# Patient Record
Sex: MALE | Race: ASIAN | Employment: OTHER | URBAN - METROPOLITAN AREA
[De-identification: names, ages, dates, MRNs, and addresses within clinical notes are randomized per-mention and may not be internally consistent; named-entity substitution may affect disease eponyms.]

---

## 2024-04-26 ENCOUNTER — HOSPITAL ENCOUNTER (EMERGENCY)
Facility: HOSPITAL | Age: 74
Discharge: HOME | End: 2024-04-26
Attending: EMERGENCY MEDICINE
Payer: MEDICARE

## 2024-04-26 ENCOUNTER — APPOINTMENT (OUTPATIENT)
Dept: CT IMAGING | Facility: HOSPITAL | Age: 74
End: 2024-04-26
Attending: EMERGENCY MEDICINE
Payer: MEDICARE

## 2024-04-26 VITALS
RESPIRATION RATE: 18 BRPM | SYSTOLIC BLOOD PRESSURE: 168 MMHG | OXYGEN SATURATION: 99 % | HEART RATE: 88 BPM | DIASTOLIC BLOOD PRESSURE: 72 MMHG | TEMPERATURE: 98 F | WEIGHT: 135 LBS
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
## 2024-04-26 DIAGNOSIS — S01.81XA FACIAL LACERATION, INITIAL ENCOUNTER: ICD-10-CM

## 2024-04-26 PROCEDURE — 99283 EMERGENCY DEPT VISIT LOW MDM: CPT

## 2024-04-26 PROCEDURE — 12011 RPR F/E/E/N/L/M 2.5 CM/<: CPT

## 2024-04-26 PROCEDURE — 90471 IMMUNIZATION ADMIN: CPT

## 2024-04-26 PROCEDURE — 99284 EMERGENCY DEPT VISIT MOD MDM: CPT

## 2024-04-26 RX ORDER — AMLODIPINE BESYLATE 5 MG/1
5 TABLET ORAL DAILY
COMMUNITY

## 2024-04-26 RX ORDER — TRAMADOL HYDROCHLORIDE 50 MG/1
TABLET ORAL EVERY 6 HOURS PRN
Qty: 6 TABLET | Refills: 0 | Status: SHIPPED | OUTPATIENT
Start: 2024-04-26

## 2024-04-26 RX ORDER — SIMVASTATIN 10 MG
10 TABLET ORAL NIGHTLY
COMMUNITY

## 2024-04-26 RX ORDER — CEPHALEXIN 500 MG/1
500 CAPSULE ORAL 3 TIMES DAILY
Qty: 6 CAPSULE | Refills: 0 | Status: SHIPPED | OUTPATIENT
Start: 2024-04-26 | End: 2024-04-29

## 2024-04-26 NOTE — ED INITIAL ASSESSMENT (HPI)
Pt states he had tripped falling forward. Lac to upper lip and interior portior of upper gums   Denies any dizziness or syncope or LOC

## 2024-04-26 NOTE — DISCHARGE INSTRUCTIONS
For the laceration, would keep dry for 24 hours and then can wash gently with soap and water.  Sutures out in 4 to 5 days.  Will prophylax with antibiotics Keflex 1 tablet 3 times a day for 3 days.  Can take 1-2 extreme Tylenol as needed.  Recheck blood pressure with primary care.

## 2024-04-27 NOTE — ED PROVIDER NOTES
Patient Seen in: [facility] Emergency Department      History     Chief Complaint   Patient presents with    Laceration/Abrasion     Stated Complaint: fall, hit head, no blood thinners    Subjective:   HPI    Patient is a 73-year-old gentleman presents with a laceration to his upper lip.  Patient had dentures in.  Says he fell and hit his upper lip.  He has a laceration at the vermilion border of the upper lip.  He has abrasions of the gum and the inner aspect.  Does not appear to go all the way through the lip.  No other head injury.  No headache.  No neck pain.  Both daughters are at bedside.  Does not take blood thinners.  No other specific complaints.  No other injuries or complaints.    Objective:   Past Medical History:    Essential hypertension    Hyperlipidemia              History reviewed. No pertinent surgical history.             Social History     Socioeconomic History    Marital status:    Tobacco Use    Smoking status: Never    Smokeless tobacco: Never   Vaping Use    Vaping status: Never Used   Substance and Sexual Activity    Alcohol use: Never    Drug use: Never              Review of Systems    Positive for stated complaint: fall, hit head, no blood thinners  Other systems are as noted in HPI.  Constitutional and vital signs reviewed.      All other systems reviewed and negative except as noted above.    Physical Exam     ED Triage Vitals   BP 04/26/24 1548 (!) 189/77   Pulse 04/26/24 1548 105   Resp 04/26/24 1548 18   Temp 04/26/24 1548 97.7 °F (36.5 °C)   Temp src 04/26/24 1738 Temporal   SpO2 04/26/24 1548 98 %   O2 Device 04/26/24 1738 None (Room air)       Current:BP (!) 168/72   Pulse 88   Temp 97.8 °F (36.6 °C) (Temporal)   Resp 18   Wt 61.2 kg   SpO2 99%         Physical Exam    General: Well-appearing patient of stated age resting comfortably  HEENT: Normocephalic.  There is a 2 cm laceration along the vermilion border of the upper lip.  Fairly deep though not through  and through.  There is abrasion to the inner aspect of the gumline.  No bony tenderness.  No midline cervical spine tenderness nonicteric sclera.  Moist mucous membranes  Lungs: No tachypnea  Cardiac: No tachycardia  Skin: No rashes, pallor  Neuro: No focal deficits.  Smiling, well-appearing.  Nonfocal  Extremities: No cyanosis/edema    ED Course   Labs Reviewed - No data to display                   MDM      Patient is a 73-year-old gentleman presents with a mechanical fall.  Fell forward and hit his upper lip, face.  Has a laceration.  There is no deformity or bony tenderness.  He is not on blood thinners.  No headache.  No loss of consciousness.  Discussed with patient, offered CT scan and initially patient and family wanted to head CT.  After period of observation he felt well and declined.    Facial laceration.  1% lidocaine was used and infraorbital block was performed bilaterally.  Good anesthesia was achieved.  Wound was copiously irrigated 18-gauge pressurized system.  6-0 Prolene was used.  Good cosmetic results were achieved.  Discussed options with patient.  Will prophylax with 3 days of Keflex given location depth of the laceration.  Return if headaches, vomiting, change mind about CT scan.                                   Medical Decision Making      Disposition and Plan     Clinical Impression:  1. Fall, initial encounter    2. Facial laceration, initial encounter         Disposition:  Discharge  4/26/2024  6:50 pm    Follow-up:  [facility] Emergency Department  [address] [place] [id number]  [phone]  Follow up in 5 day(s)  For suture removal.  Can follow-up here at any of our walk-in clinics or immediate cares          Medications Prescribed:  Discharge Medication List as of 4/26/2024  6:52 PM        START taking these medications    Details   cephalexin (KEFLEX) 500 MG Oral Cap Take 1 capsule (500 mg total) by mouth 3 (three) times daily for 3 days., Normal, Disp-6  capsule, R-0      traMADol 50 MG Oral Tab Take 1-2 tablets ( mg total) by mouth every 6 (six) hours as needed for Pain., Normal, Disp-6 tablet, R-0

## 2025-05-09 ENCOUNTER — HOSPITAL ENCOUNTER (EMERGENCY)
Facility: HOSPITAL | Age: 75
Discharge: HOME | End: 2025-05-09
Attending: EMERGENCY MEDICINE
Payer: MEDICARE

## 2025-05-09 VITALS
RESPIRATION RATE: 16 BRPM | SYSTOLIC BLOOD PRESSURE: 132 MMHG | HEART RATE: 86 BPM | BODY MASS INDEX: 21.03 KG/M2 | DIASTOLIC BLOOD PRESSURE: 103 MMHG | OXYGEN SATURATION: 100 % | HEIGHT: 67 IN | WEIGHT: 134 LBS

## 2025-05-09 DIAGNOSIS — R42 ORTHOSTATIC LIGHTHEADEDNESS: Primary | ICD-10-CM

## 2025-05-09 LAB
ALBUMIN SERPL-MCNC: 4.5 G/DL (ref 3.2–4.8)
ALBUMIN/GLOB SERPL: 1.6 {RATIO} (ref 1–2)
ALP LIVER SERPL-CCNC: 54 U/L (ref 45–117)
ALT SERPL-CCNC: 23 U/L (ref 10–49)
ANION GAP SERPL CALC-SCNC: 7 MMOL/L (ref 0–18)
AST SERPL-CCNC: 23 U/L (ref ?–34)
ATRIAL RATE: 88 BPM
BASOPHILS # BLD AUTO: 0.06 X10(3) UL (ref 0–0.2)
BASOPHILS NFR BLD AUTO: 0.9 %
BILIRUB SERPL-MCNC: 0.7 MG/DL (ref 0.2–1.1)
BUN BLD-MCNC: 14 MG/DL (ref 9–23)
CALCIUM BLD-MCNC: 9.5 MG/DL (ref 8.7–10.6)
CHLORIDE SERPL-SCNC: 106 MMOL/L (ref 98–112)
CO2 SERPL-SCNC: 26 MMOL/L (ref 21–32)
CREAT BLD-MCNC: 0.82 MG/DL (ref 0.7–1.3)
EGFRCR SERPLBLD CKD-EPI 2021: 92 ML/MIN/1.73M2 (ref 60–?)
EOSINOPHIL # BLD AUTO: 0.53 X10(3) UL (ref 0–0.7)
EOSINOPHIL NFR BLD AUTO: 7.7 %
ERYTHROCYTE [DISTWIDTH] IN BLOOD BY AUTOMATED COUNT: 12.9 %
GLOBULIN PLAS-MCNC: 2.9 G/DL (ref 2–3.5)
GLUCOSE BLD-MCNC: 176 MG/DL (ref 70–99)
GLUCOSE BLD-MCNC: 178 MG/DL (ref 70–99)
HCT VFR BLD AUTO: 37.5 % (ref 39–53)
HGB BLD-MCNC: 13.4 G/DL (ref 13–17.5)
IMM GRANULOCYTES # BLD AUTO: 0.02 X10(3) UL (ref 0–1)
IMM GRANULOCYTES NFR BLD: 0.3 %
LYMPHOCYTES # BLD AUTO: 2.81 X10(3) UL (ref 1–4)
LYMPHOCYTES NFR BLD AUTO: 40.9 %
MCH RBC QN AUTO: 31.4 PG (ref 26–34)
MCHC RBC AUTO-ENTMCNC: 35.7 G/DL (ref 31–37)
MCV RBC AUTO: 87.8 FL (ref 80–100)
MONOCYTES # BLD AUTO: 0.53 X10(3) UL (ref 0.1–1)
MONOCYTES NFR BLD AUTO: 7.7 %
NEUTROPHILS # BLD AUTO: 2.92 X10 (3) UL (ref 1.5–7.7)
NEUTROPHILS # BLD AUTO: 2.92 X10(3) UL (ref 1.5–7.7)
NEUTROPHILS NFR BLD AUTO: 42.5 %
OSMOLALITY SERPL CALC.SUM OF ELEC: 293 MOSM/KG (ref 275–295)
P AXIS: 54 DEGREES
P-R INTERVAL: 142 MS
PLATELET # BLD AUTO: 257 10(3)UL (ref 150–450)
POTASSIUM SERPL-SCNC: 3.5 MMOL/L (ref 3.5–5.1)
PROT SERPL-MCNC: 7.4 G/DL (ref 5.7–8.2)
Q-T INTERVAL: 354 MS
QRS DURATION: 80 MS
QTC CALCULATION (BEZET): 428 MS
R AXIS: 34 DEGREES
RBC # BLD AUTO: 4.27 X10(6)UL (ref 3.8–5.8)
SODIUM SERPL-SCNC: 139 MMOL/L (ref 136–145)
T AXIS: 61 DEGREES
TROPONIN I SERPL HS-MCNC: <3 NG/L (ref ?–53)
VENTRICULAR RATE: 88 BPM
WBC # BLD AUTO: 6.9 X10(3) UL (ref 4–11)

## 2025-05-09 PROCEDURE — 99284 EMERGENCY DEPT VISIT MOD MDM: CPT

## 2025-05-09 PROCEDURE — 96360 HYDRATION IV INFUSION INIT: CPT

## 2025-05-09 PROCEDURE — 82962 GLUCOSE BLOOD TEST: CPT

## 2025-05-09 PROCEDURE — 93010 ELECTROCARDIOGRAM REPORT: CPT

## 2025-05-09 PROCEDURE — 80053 COMPREHEN METABOLIC PANEL: CPT | Performed by: EMERGENCY MEDICINE

## 2025-05-09 PROCEDURE — 84484 ASSAY OF TROPONIN QUANT: CPT | Performed by: EMERGENCY MEDICINE

## 2025-05-09 PROCEDURE — 85025 COMPLETE CBC W/AUTO DIFF WBC: CPT | Performed by: EMERGENCY MEDICINE

## 2025-05-09 PROCEDURE — 93005 ELECTROCARDIOGRAM TRACING: CPT

## 2025-05-09 NOTE — ED PROVIDER NOTES
Patient Seen in: [facility] Emergency Department      History     Chief Complaint   Patient presents with    Dizziness     Stated Complaint: dizziness    Subjective:   HPI    74-year-old male reported experiencing dizziness and lightheadedness prior to the encounter. The symptoms began while he was sitting at dinner and then intensified as he got up to walk to the stairs. He did not experience any chest pain, sweating, nausea, numbness, weakness in the arms or legs, or slurred speech during these episodes. He mentioned that the dizziness subsided after a few minutes. Two days ago, he experienced some shoulder discomfort, which he attributed to a little arthritis and this was in his right shoulder. He has not experienced similar episodes of dizziness in the past and has not had any episodes of vasovagal syncope. He reported drinking a little water prior to the dizziness.  History of Present Illness               Objective:     Past Medical History:    Essential hypertension    Hyperlipidemia              History reviewed. No pertinent surgical history.             Social History     Socioeconomic History    Marital status:    Tobacco Use    Smoking status: Never    Smokeless tobacco: Never   Vaping Use    Vaping status: Never Used   Substance and Sexual Activity    Alcohol use: Never    Drug use: Never                                Physical Exam     ED Triage Vitals [05/09/25 0018]   /81   Pulse 97   Resp 18   Temp    Temp src    SpO2 100 %   O2 Device        Current Vitals:   Vital Signs  BP: (!) 140/107  Pulse: 94  Resp: 14  MAP (mmHg): (!) 113    Oxygen Therapy  SpO2: 99 %        Physical Exam    Vital signs reviewed  General appearance: Patient is alert and in no acute distress  HEENT: Pupils equal react to light extraocular muscles intact no scleral icterus, mucous membranes are moist, there is no erythema or exudate in the posterior pharynx, no nystagmus  Neck: Supple no JVD no  lymphadenopathy no meningismus no carotid bruit  CV: Regular rate and rhythm no murmur rub  Respiratory: Clear to auscultation bilaterally no crackles no wheezes no accessory muscle use  Abdomen: Soft nontender nondistended, no rebound no guarding  no hepatosplenomegaly bowel sounds are present , no pulsatile mass  Extremities: No clubbing cyanosis or edema 2+ distal pulses.  Neuro: Cranial nerves II through XII intact with no gross focal sensory or motor abnormality.    Physical Exam                ED Course     Labs Reviewed   COMP METABOLIC PANEL (14) - Abnormal; Notable for the following components:       Result Value    Glucose 176 (*)     All other components within normal limits   CBC WITH DIFFERENTIAL WITH PLATELET - Abnormal; Notable for the following components:    HCT 37.5 (*)     All other components within normal limits   POCT GLUCOSE - Abnormal; Notable for the following components:    POC Glucose 178 (*)     All other components within normal limits   TROPONIN I HIGH SENSITIVITY - Normal   RAINBOW DRAW LAVENDER   RAINBOW DRAW LIGHT GREEN   RAINBOW DRAW BLUE   RAINBOW DRAW GOLD     EKG    Rate, intervals and axes as noted on EKG Report.  Rate: 88  Rhythm: Sinus Rhythm  Reading: Normal sinus rhythm              Results            Patient was evaluated the emergency department had a CBC chemistry troponin.  Laboratory data was unremarkable.  Do not feel he needs a head CT or chest x-ray his symptoms sound like it was vasovagal as it happened after he had been sitting and stood up and it just felt like very lightheaded not room spinning he had no focal weakness or slurred speech.  He is feeling much better after IV fluids and told him just make sure he is drinking plenty.  Return if any worsening problem follow-up with his primary.  He was just nervous about a stroke so came in for evaluation but had no deficits             MDM      Differential diagnosis reflecting the complexity of care include:  Orthostatic hypotension, dehydration, metabolic abnormality, arrhythmia    Comorbidities that add complexity to management include: Hypertension, high cholesterol      Diagnostic tests and medications considered but not ordered were: Chest x-ray or head CT was considered but patient has no headache no stroke symptoms.  And no shortness of breath      Shared decision making was done by myself and patient.  Patient be discharged home drink plenty of fluids.  Follow-up with primary.  Return if worse.            Medical Decision Making      Disposition and Plan     Clinical Impression:  1. Orthostatic lightheadedness         Disposition:  Discharge  5/9/2025  1:51 am    Follow-up:  Ronda Thurston  [phone]  [facility] [phone]  [facility] [address] [phone]    Follow up            Medications Prescribed:  Current Discharge Medication List          Supplementary Documentation: Patient was screened and evaluated during this visit.  As the treating physician attending to the patient, I determined within reasonable clinical confidence and prior to discharge, that an emergency medical condition was not or was no longer present.  There was no indication for further evaluation, treatment, or admission on an emergency basis.  Comprehensive verbal and written discharge and follow-up instructions were provided to help prevent relapse or worsening.  Patient was instructed to follow-up with primary care provider for further evaluation treatment, return immediately to ER for worsening, concerning, new, or changing/persisting symptoms.  I discussed the case with the patient and they had no questions, complaints, or concerns.  Patient was comfortable going home.      Dictation Disclaimer Note:   To increase efficiency this document may have been prepared using voice recognition technology. Every effort has been made to correct any errors made during preparation of this note. However, if a word or phrase is confusing, or does  not make sense, this is likely due to a recognition error within the program which was not discovered during editing. Please do not hesitate to contact to address any significant errors.    Note to Patient:   The 21st Century Cures Act makes medical notes like these available to patients in the interest of transparency. Please be advised this is a medical document. Medical documents are intended to carry relevant information, facts as evident, and the clinical opinion of the practitioner. The medical note is intended as peer to peer communication and may appear blunt or direct. It is written in medical language and may contain abbreviations or verbiage that are unfamiliar.

## 2025-05-09 NOTE — ED INITIAL ASSESSMENT (HPI)
Pt arrived via ems from his home with dizziness. He was going to go upstairs to go to bed when he started feeling dizzy. He was concerned that he may be having a stroke d/t the dizzy feeling. Denies any substance use, denies \"dizzy\" feeling now, does not have any visual changes nor positional changes. He does not have a cardiac or stroke history.